# Patient Record
Sex: FEMALE | Race: WHITE
[De-identification: names, ages, dates, MRNs, and addresses within clinical notes are randomized per-mention and may not be internally consistent; named-entity substitution may affect disease eponyms.]

---

## 2018-09-12 ENCOUNTER — HOSPITAL ENCOUNTER (EMERGENCY)
Dept: HOSPITAL 57 - BURERS | Age: 58
Discharge: ANOTHER HOSPITAL | End: 2018-09-12
Payer: COMMERCIAL

## 2018-09-12 ENCOUNTER — HOSPITAL ENCOUNTER (OUTPATIENT)
Dept: HOSPITAL 92 - ERS | Age: 58
Setting detail: OBSERVATION
LOS: 1 days | Discharge: HOME | End: 2018-09-13
Attending: HOSPITALIST | Admitting: HOSPITALIST
Payer: SELF-PAY

## 2018-09-12 VITALS — BODY MASS INDEX: 24.5 KG/M2

## 2018-09-12 DIAGNOSIS — K82.8: Primary | ICD-10-CM

## 2018-09-12 DIAGNOSIS — Z79.899: ICD-10-CM

## 2018-09-12 DIAGNOSIS — E78.5: ICD-10-CM

## 2018-09-12 DIAGNOSIS — R07.9: Primary | ICD-10-CM

## 2018-09-12 DIAGNOSIS — E06.3: ICD-10-CM

## 2018-09-12 DIAGNOSIS — F41.9: ICD-10-CM

## 2018-09-12 DIAGNOSIS — F17.210: ICD-10-CM

## 2018-09-12 DIAGNOSIS — I10: ICD-10-CM

## 2018-09-12 DIAGNOSIS — E11.9: ICD-10-CM

## 2018-09-12 DIAGNOSIS — E03.9: ICD-10-CM

## 2018-09-12 DIAGNOSIS — Z79.84: ICD-10-CM

## 2018-09-12 DIAGNOSIS — Z88.5: ICD-10-CM

## 2018-09-12 DIAGNOSIS — E05.00: ICD-10-CM

## 2018-09-12 LAB
ALBUMIN SERPL BCG-MCNC: 4.4 G/DL (ref 3.5–5)
ALP SERPL-CCNC: 73 U/L (ref 40–150)
ALT SERPL W P-5'-P-CCNC: 12 U/L (ref 8–55)
ANION GAP SERPL CALC-SCNC: 18 MMOL/L (ref 10–20)
AST SERPL-CCNC: 10 U/L (ref 5–34)
BASOPHILS # BLD AUTO: 0.1 THOU/UL (ref 0–0.2)
BASOPHILS NFR BLD AUTO: 0.5 % (ref 0–1)
BILIRUB SERPL-MCNC: 0.4 MG/DL (ref 0.2–1.2)
BUN SERPL-MCNC: 10 MG/DL (ref 9.8–20.1)
CALCIUM SERPL-MCNC: 9.7 MG/DL (ref 7.8–10.44)
CHLORIDE SERPL-SCNC: 102 MMOL/L (ref 98–107)
CK MB SERPL-MCNC: 2.1 NG/ML (ref 0–6.6)
CO2 SERPL-SCNC: 21 MMOL/L (ref 22–29)
CREAT CL PREDICTED SERPL C-G-VRATE: 0 ML/MIN (ref 70–130)
EOSINOPHIL # BLD AUTO: 0.3 THOU/UL (ref 0–0.7)
EOSINOPHIL NFR BLD AUTO: 1.8 % (ref 0–10)
GLOBULIN SER CALC-MCNC: 3.2 G/DL (ref 2.4–3.5)
GLUCOSE SERPL-MCNC: 173 MG/DL (ref 70–105)
HGB BLD-MCNC: 12.4 G/DL (ref 12–16)
LIPASE SERPL-CCNC: 32 U/L (ref 8–78)
LYMPHOCYTES # BLD AUTO: 2.6 THOU/UL (ref 1.2–3.4)
LYMPHOCYTES NFR BLD AUTO: 15.6 % (ref 21–51)
MAGNESIUM SERPL-MCNC: 1.7 MG/DL (ref 1.6–2.6)
MCH RBC QN AUTO: 26.1 PG (ref 27–31)
MCV RBC AUTO: 80.8 FL (ref 78–98)
MONOCYTES # BLD AUTO: 1.5 THOU/UL (ref 0.11–0.59)
MONOCYTES NFR BLD AUTO: 8.7 % (ref 0–10)
NEUTROPHILS # BLD AUTO: 12.3 THOU/UL (ref 1.4–6.5)
NEUTROPHILS NFR BLD AUTO: 73.5 % (ref 42–75)
PLATELET # BLD AUTO: 446 THOU/UL (ref 130–400)
POTASSIUM SERPL-SCNC: 4.2 MMOL/L (ref 3.5–5.1)
RBC # BLD AUTO: 4.75 MILL/UL (ref 4.2–5.4)
SODIUM SERPL-SCNC: 137 MMOL/L (ref 136–145)
TROPONIN I SERPL DL<=0.01 NG/ML-MCNC: (no result) NG/ML (ref ?–0.03)
WBC # BLD AUTO: 16.7 THOU/UL (ref 4.8–10.8)

## 2018-09-12 PROCEDURE — 83735 ASSAY OF MAGNESIUM: CPT

## 2018-09-12 PROCEDURE — A9537 TC99M MEBROFENIN: HCPCS

## 2018-09-12 PROCEDURE — A4216 STERILE WATER/SALINE, 10 ML: HCPCS

## 2018-09-12 PROCEDURE — 76705 ECHO EXAM OF ABDOMEN: CPT

## 2018-09-12 PROCEDURE — 96375 TX/PRO/DX INJ NEW DRUG ADDON: CPT

## 2018-09-12 PROCEDURE — 93005 ELECTROCARDIOGRAM TRACING: CPT

## 2018-09-12 PROCEDURE — C9113 INJ PANTOPRAZOLE SODIUM, VIA: HCPCS

## 2018-09-12 PROCEDURE — 36416 COLLJ CAPILLARY BLOOD SPEC: CPT

## 2018-09-12 PROCEDURE — 80053 COMPREHEN METABOLIC PANEL: CPT

## 2018-09-12 PROCEDURE — 71045 X-RAY EXAM CHEST 1 VIEW: CPT

## 2018-09-12 PROCEDURE — 84100 ASSAY OF PHOSPHORUS: CPT

## 2018-09-12 PROCEDURE — 84484 ASSAY OF TROPONIN QUANT: CPT

## 2018-09-12 PROCEDURE — 36415 COLL VENOUS BLD VENIPUNCTURE: CPT

## 2018-09-12 PROCEDURE — 85027 COMPLETE CBC AUTOMATED: CPT

## 2018-09-12 PROCEDURE — 85025 COMPLETE CBC W/AUTO DIFF WBC: CPT

## 2018-09-12 PROCEDURE — 78227 HEPATOBIL SYST IMAGE W/DRUG: CPT

## 2018-09-12 PROCEDURE — G0378 HOSPITAL OBSERVATION PER HR: HCPCS

## 2018-09-12 PROCEDURE — 82553 CREATINE MB FRACTION: CPT

## 2018-09-12 PROCEDURE — 85007 BL SMEAR W/DIFF WBC COUNT: CPT

## 2018-09-12 PROCEDURE — 96374 THER/PROPH/DIAG INJ IV PUSH: CPT

## 2018-09-12 PROCEDURE — 83690 ASSAY OF LIPASE: CPT

## 2018-09-12 NOTE — RAD
PORTABLE CHEST:

 

DATE: 9/12/18.

 

FINDINGS: 

An AP portable film at 0336 shows a normal-sized heart and clear lungs.  Faint calcification is seen 
in the aortic arch.  There is no vascular congestion, edema, or pleural effusion.  The mediastinum ap
pears normal.  There may be signs of calcific tendinitis of the right shoulder.

 

IMPRESSION: 

No acute thoracic findings.

 

POS: HOME

## 2018-09-12 NOTE — HP
DATE OF ADMISSION:  09/12/2018

 

PRIMARY CARE PHYSICIAN:  Dr. Ewing at [facility].

 

CHIEF COMPLAINT:  Abdominal pain.

 

HISTORY OF PRESENT ILLNESS:  This is a 58-year-old  female who presents to [facility] Emergency Department complaining of epigastric abdominal pain with radiation to the back which beg
an approximately 2:00 a.m., associated with nausea and vomiting.  The patient states she has longstan
ding history of recurrent episodes of nausea, vomiting, and abdominal discomfort usually affecting he
r in the middle of the night.  The patient states the pain was in the epigastric and substernal regio
n with radiation to the central portion of her back.  The patient states she had several bouts of eugene
sis and became concerned when the pain did not relent.  The patient denies any recent change to her d
ietary habits, but states she has recently been traveling to [place], [place] to visit her children an
d grandchildren.  The patient states she has had some dietary indiscretion with increased alcohol int
perry as well as lack of fruits and vegetables in her diet.  The patient states her bowel movements are
 fairly regular and she underwent endoscopy in the last 6 months showing negative findings.  The rhys
ent denied any jaw or left arm discomfort or diaphoresis.  The patient denies any known history of co
ronary artery disease or strong family history of coronary disease.  The patient does admit to contin
ued smoking up to one and a half pack of cigarettes daily as well as diabetes mellitus type 2 with la
st A1c at 6.3.  The patient denies any family members with similar symptoms, unilateral leg swelling,
 dysuria, blood in the stool or hematemesis.  In the emergency room, the patient underwent general ev
aluation including portable chest x-ray showing no acute infiltrate.  Screening metabolic survey show
ed normal LFTs and electrolytes with CBC showing elevated white blood cell count of 16.7.  Initial tr
oponin I was negative x1.  The patient was referred to the observation unit for further evaluation.

 

PAST MEDICAL HISTORY:

1.  Anxiety disorder.

2.  Tobacco abuse.

3.  Diabetes mellitus type 2 on oral hypoglycemics.

4.  Hypertension.

5.  Recurrent nausea and vomiting.

6.  Hypothyroidism with Graves' disease.

 

PAST SURGICAL HISTORY:

1.  Status post robotic-assisted hysterectomy, 2018.

2.  Status post tonsillectomy.

3.  Status post endoscopy with negative findings.

 

CURRENT MEDICATIONS:

1.  Alprazolam 0.5 mg p.o. daily.

2.  Lexapro 20 mg p.o. daily.

3.  Estradiol 1 mg p.o. daily.

4.  Vytorin 10/20 mg 1 tab p.o. at bedtime.

5.  Lisinopril 10 mg p.o. daily.

6.  Metformin extended release 500 mg p.o. q.i.d.

7.  Methimazole 5 mg p.o. b.i.d.

8.  Januvia 100 mg p.o. daily.

 

ALLERGIES:  CODEINE.

 

FAMILY HISTORY:  Positive for diabetes mellitus and hypertension.

 

SOCIAL HISTORY:  Patient is , accompanied by her  in the hospital.  Resides in [place], [place].  Smokes up to one and a half packs of cigarettes daily.  Occasional alcohol use.  No illicit
 drug use.  Functional of all activities of daily living.

 

REVIEW OF SYSTEMS:  The following complete review of systems was negative, unless otherwise mentioned
 in the HPI or below:  Constitutional:  Weight loss or gain, ability to conduct usual activities.  Sk
in:  Rash, itching.  Eyes:  Double vision, pain.  ENT/Mouth:  Nose bleeding, neck stiffness, pain, te
nderness.  Cardiovascular:  Palpitations, dyspnea on exertion, orthopnea.  Respiratory:  Shortness of
 breath, wheezing, cough, hemoptysis, fever or night sweats.  Gastrointestinal:  Poor appetite, abdom
inal pain, heartburn, nausea, vomiting, constipation, or diarrhea.  Genitourinary:  Urgency, frequenc
y, dysuria, nocturia.  Musculoskeletal:  Pain, swelling.  Neurologic/Psychiatric:  Anxiety, depressio
n.  Allergy/Immunologic:  Skin rash, bleeding tendency.

 

PHYSICAL EXAMINATION:

VITAL SIGNS:  Currently, blood pressure 122/57, pulse 65, respiratory rate 20, temperature 97.5 degre
es Fahrenheit, O2 saturation 98% on room air.

GENERAL APPEARANCE:  This is a 58-year-old  female, alert and oriented x3, pleasant, in no a
cute distress.

HEENT:  Pupils are equal, round, and reactive to light and accommodation.  Extraocular muscles are in
tact.  No scleral icterus, no conjunctival injection.  Nares patent.  OP is clear.  Teeth in good rep
air.

NECK:  Supple, no cervical adenopathy, no thyromegaly, no carotid bruits, no JVD appreciated.  Cervic
al spine with full active and passive range of motion.  No meningeal signs appreciated.

CHEST:  Lungs are clear to auscultation bilaterally.

CARDIOVASCULAR:  S1, S2, without noted murmur, rub or gallop.

ABDOMEN:  Rounded with mild tenderness to palpation in the midepigastric region.  No rebound or guard
ing noted.  Bowel sounds are positive in all four quadrants.

EXTREMITIES:  Warm and dry with fair turgor.  No clubbing, cyanosis or asymmetric edema appreciated. 
 Pulses are palpable distally at the dorsalis pedis, posterior tibial, and popliteal arteries bilater
ally.  Capillary refill less than 2 seconds.

NEUROLOGIC:  Cranial nerves II-XII are grossly intact.  No focal or lateralizing signs appreciated.

 

PERTINENT LABORATORY AND X-RAY FINDINGS:  Basic metabolic profile within normal limits.  Calcium 9.7.
  LFTs within normal limits.  Troponin I negative x2.  CBC showed a white blood cell count of 16.7, h
emoglobin 12, hematocrit 38, platelet count 446 with 74% neutrophils.  Portable chest x-ray dated 09/
12/2018 by my review shows no acute cardiopulmonary process.  EKG dated 09/12/2018 by my interpretati
on shows sinus mechanism with heart rates in the 60s.  Normal R-wave progression noted in the precord
ial leads.  Normal axis.  Low voltage tracing.  No acute ST-T wave changes appreciated.

 

ASSESSMENT AND PLAN:

1.  Epigastric abdominal pain.  The patient will be observed on the telemetry unit.  Exact etiology i
s unclear.  We will obtain abdominal ultrasound with attention to the right upper quadrant and midepi
gastric region to rule out potential gallbladder etiology.  Check lipase and serial troponin I.  Cont
inue symptomatic and supportive management and monitor clinical response.

2.  Nausea and vomiting -- chronic in nature according to the patient's history.  Continue antiemetic
s with Zofran 4 mg IV every 6 hours p.r.n.

3.  Tobacco abuse.  We will offer smoking cessation resources prior to discharge.

4.  Diabetes mellitus type 2, on oral hypoglycemics.  We will hold metformin due to increased abdomin
al pain.  Continue Januvia 100 mg daily.  Insulin sliding scale for reflexive coverage.  ADA diet.

5.  Hypertension.  Resume home antihypertensive regimen with Lisinopril 10 mg daily.

6.  Anxiety disorder.  Continue alprazolam 0.5 mg daily and Lexapro 20 mg daily.

7.  Prophylaxis.  Sequential compression devices while in bed.  Pepcid 20 mg p.o. b.i.d.

8.  Code status is FULL.  Surrogate medical decision maker is patient's spouse.

## 2018-09-13 VITALS — DIASTOLIC BLOOD PRESSURE: 67 MMHG | SYSTOLIC BLOOD PRESSURE: 115 MMHG

## 2018-09-13 VITALS — TEMPERATURE: 98.1 F

## 2018-09-13 LAB
ALBUMIN SERPL BCG-MCNC: 3.7 G/DL (ref 3.5–5)
ALP SERPL-CCNC: 69 U/L (ref 40–150)
ALT SERPL W P-5'-P-CCNC: 10 U/L (ref 8–55)
ANION GAP SERPL CALC-SCNC: 13 MMOL/L (ref 10–20)
AST SERPL-CCNC: 9 U/L (ref 5–34)
BILIRUB SERPL-MCNC: 0.3 MG/DL (ref 0.2–1.2)
BUN SERPL-MCNC: 9 MG/DL (ref 9.8–20.1)
CALCIUM SERPL-MCNC: 8.7 MG/DL (ref 7.8–10.44)
CHLORIDE SERPL-SCNC: 105 MMOL/L (ref 98–107)
CO2 SERPL-SCNC: 25 MMOL/L (ref 22–29)
CREAT CL PREDICTED SERPL C-G-VRATE: 90 ML/MIN (ref 70–130)
GLOBULIN SER CALC-MCNC: 2.8 G/DL (ref 2.4–3.5)
GLUCOSE SERPL-MCNC: 153 MG/DL (ref 70–105)
HGB BLD-MCNC: 10.5 G/DL (ref 12–16)
MCH RBC QN AUTO: 27.2 PG (ref 27–31)
MCV RBC AUTO: 83.6 FL (ref 78–98)
MDIFF COMPLETE?: YES
PLATELET # BLD AUTO: 367 THOU/UL (ref 130–400)
POTASSIUM SERPL-SCNC: 3.7 MMOL/L (ref 3.5–5.1)
RBC # BLD AUTO: 3.85 MILL/UL (ref 4.2–5.4)
SODIUM SERPL-SCNC: 139 MMOL/L (ref 136–145)
WBC # BLD AUTO: 7.8 THOU/UL (ref 4.8–10.8)

## 2018-09-13 NOTE — ULT
RIGHT UPPER QUADRANT ULTRASOUND:

 

HISTORY: 

Epigastric pain, right upper quadrant pain, nausea and vomiting.

 

FINDINGS: 

The liver demonstrates increased echogenicity without focal mass or intrahepatic ductal dilatation.  
No focal mass or abnormal biliary ductal dilatation is seen.  The common duct measures 3 mm in diamet
er.  No shadowing calculi are seen in the gallbladder.  There is gallbladder wall thickening measurin
g 4 mm.  There is a small amount of sludge in the gallbladder.  There is a small amount of pericholec
ystic fluid.  The right kidney and visualized portions of the pancreas are normal.

 

IMPRESSION: 

1.  Fatty liver.

 

2.  A small amount of sludge in the gallbladder with gallbladder wall thickening and pericholecystic 
fluid.  Findings are suspicious for acute cholecystitis.  Further evaluation with HIDA scan would be 
helpful.

 

CODE T

 

POS: [facility]

## 2018-09-13 NOTE — NM
HEPATOBILIARY SCAN:

 

HISTORY: 

Abnormal gallbladder ultrasound, right upper quadrant pain, nausea, vomiting, epigastric pain.

 

RADIOPHARMACEUTICAL:

5 mCi Technetium 99-mebrofenin injected intravenously.

 

FINDINGS: 

There is good tracer extraction with prompt excretion of the biliary tract and small bowel loops.  Th
ere is filling of the gallbladder after 60 minutes.  The calculated gallbladder ejection fraction fol
lowing an oral fatty meal measures 24%.

 

IMPRESSION: 

Chronic acalculous cholecystitis/gallbladder dyskinesia.

 

POS: [facility]

## 2018-09-14 NOTE — DIS
DATE OF ADMISSION:  09/12/2018

 

DATE OF DISCHARGE:  09/13/2018

 

DISCHARGE DIAGNOSES:

1.  Abdominal pain secondarily to #2.

2.  Chronic acalculous cholecystitis/gallbladder dyskinesia.

3.  Nausea and vomiting secondarily to #2, resolved.

4.  Tobacco abuse.

5.  Diabetes mellitus type 2, on oral hypoglycemics.

6.  Hypertension, stable.

 

CONSULTATIONS:  None.

 

PERTINENT LABORATORY AND X-RAY FINDINGS:  Phosphorus 3.1, magnesium 1.7, troponin I negative x3.  Lip
ase 32.  LFTs within normal limits.  CBC showed a white blood cell count of 7.8, hemoglobin 11, hemat
ocrit 32, platelet count 367,000.  Abdominal ultrasound dated 09/13/2018, showed gallbladder sludge w
ith mild wall thickening and pericholecystic fluid.  Fatty liver noted.  HIDA scan dated 09/13/2018, 
showed chronic acalculous cholecystitis/gallbladder dyskinesia with gallbladder ejection fraction 24%
.  Portable chest x-ray dated 09/12/2018, showed no acute cardiopulmonary process.

 

HOSPITAL COURSE:  The patient was placed in observation after presenting with epigastric abdominal pa
in, undergoing general evaluation including abdominal ultrasound showing gallbladder sludge.  The pat
ient underwent a HIDA scan, confirming gallbladder dyskinesia with depressed gallbladder ejection fra
ction of 24% and likely chronic acalculous cholecystitis.  Discussions were had with the patient rega
rding management and recommendations for outpatient followup with General Surgery to consider cholecy
stectomy.  Overall, patient did remain clinically stable through the remainder of the hospital course
.  I have examined the patient at the time of discharge and discussed followup instructions, at which
 point the patient and family verbalized understanding and agreement.  The patient overall clinically
 stable and ready for discharge on 09/13/2018.

 

DISCHARGE MEDICATIONS:

1.  Alprazolam 0.5 mg p.o. daily.

2.  Lexapro 20 mg p.o. daily.

3.  Estradiol 1 mg p.o. daily.

4.  Vytorin 10/20 mg 1 tab p.o. at bedtime.

5.  Lisinopril 10 mg p.o. daily.

6.  Metformin  mg p.o. q.i.d.

7.  Methimazole 5 mg p.o. b.i.d.

8.  Januvia 100 mg p.o. daily.

 

FOLLOWUP:  The patient will follow up with her primary care provider, Dr. Sofía Presley at [facility] within 7 days of discharge.  The patient may also follow up with her general surgeo
n of choice at [facility].

 

CONDITION ON DISCHARGE:  Stable.

 

ACTIVITY:  Ad kemal.

 

DIET:  ADA and low fat.

 

CODE STATUS:  FULL.

 

DISPOSITION:  Home, 09/13/2018.